# Patient Record
(demographics unavailable — no encounter records)

---

## 2025-02-28 NOTE — DISCUSSION/SUMMARY
[FreeTextEntry1] : Grossly normal but technically challenging study. Due to limitations, recommend non urgent  echocardiogram which can be in outpatient setting at 1-2 months of life, sooner if any concerns.  Please do not hesitate to contact me if you have any questions.   Brandan Vasquez MD, MS, FAAP, FACC Attending Physician, Pediatric Cardiology [facility] Physician [address], [address] [place] [address] Office: [phone] Fax: [phone] Email: [email or web address]     I have spent 60 minutes of time on the encounter excluding separately reported services.

## 2025-02-28 NOTE — HISTORY OF PRESENT ILLNESS
[FreeTextEntry1] : Dear Dr. Lloyd:   I had the pleasure of seeing your patient, FRIDA DASH, in my office today, 02/28/2025. She was referred to pediatric cardiology for fetal echocardiogram due to limited views of the heart. GA 27w3d. She has been doing well from a clinical standpoint. There is no family history of congenital heart disease.

## 2025-07-01 NOTE — HISTORY OF PRESENT ILLNESS
[Postpartum Follow Up] : postpartum follow up [Repeat C/S] : delivered by  section (repeat) [Healed] : healed [Back to Normal] : is back to normal in size [Normal] : the vagina was normal [Not Done] : Examination of breasts not done [Doing Well] : is doing well [No Sign of Infection] : is showing no signs of infection [Excellent Pain Control] : has excellent pain control [None] : None [Complications:___] : no complications [Breastfeeding] : not currently nursing [S/Sx PP Depression] : no signs/symptoms of postpartum depression [Erythema] : not erythematous [Cervix Sample Taken] : cervical sample not taken for a Pap smear [FreeTextEntry8] : 6 weeks